# Patient Record
Sex: FEMALE | Race: WHITE | NOT HISPANIC OR LATINO
[De-identification: names, ages, dates, MRNs, and addresses within clinical notes are randomized per-mention and may not be internally consistent; named-entity substitution may affect disease eponyms.]

---

## 2018-07-24 PROBLEM — Z00.00 ENCOUNTER FOR PREVENTIVE HEALTH EXAMINATION: Status: ACTIVE | Noted: 2018-07-24

## 2018-08-23 ENCOUNTER — APPOINTMENT (OUTPATIENT)
Dept: OPHTHALMOLOGY | Facility: CLINIC | Age: 53
End: 2018-08-23

## 2019-03-05 ENCOUNTER — OUTPATIENT (OUTPATIENT)
Dept: OUTPATIENT SERVICES | Facility: HOSPITAL | Age: 54
LOS: 1 days | End: 2019-03-05
Payer: COMMERCIAL

## 2019-03-05 VITALS
HEIGHT: 63 IN | TEMPERATURE: 97 F | OXYGEN SATURATION: 98 % | WEIGHT: 177.91 LBS | HEART RATE: 74 BPM | SYSTOLIC BLOOD PRESSURE: 104 MMHG | DIASTOLIC BLOOD PRESSURE: 62 MMHG | RESPIRATION RATE: 14 BRPM

## 2019-03-05 DIAGNOSIS — M65.30 TRIGGER FINGER, UNSPECIFIED FINGER: ICD-10-CM

## 2019-03-05 DIAGNOSIS — M65.351 TRIGGER FINGER, RIGHT LITTLE FINGER: ICD-10-CM

## 2019-03-05 LAB
HCT VFR BLD CALC: 41.5 % — SIGNIFICANT CHANGE UP (ref 34.5–45)
HGB BLD-MCNC: 13.2 G/DL — SIGNIFICANT CHANGE UP (ref 11.5–15.5)
MCHC RBC-ENTMCNC: 26.9 PG — LOW (ref 27–34)
MCHC RBC-ENTMCNC: 31.8 % — LOW (ref 32–36)
MCV RBC AUTO: 84.5 FL — SIGNIFICANT CHANGE UP (ref 80–100)
NRBC # FLD: 0 K/UL — LOW (ref 25–125)
PLATELET # BLD AUTO: 357 K/UL — SIGNIFICANT CHANGE UP (ref 150–400)
PMV BLD: 10.6 FL — SIGNIFICANT CHANGE UP (ref 7–13)
RBC # BLD: 4.91 M/UL — SIGNIFICANT CHANGE UP (ref 3.8–5.2)
RBC # FLD: 12.9 % — SIGNIFICANT CHANGE UP (ref 10.3–14.5)
WBC # BLD: 9.97 K/UL — SIGNIFICANT CHANGE UP (ref 3.8–10.5)
WBC # FLD AUTO: 9.97 K/UL — SIGNIFICANT CHANGE UP (ref 3.8–10.5)

## 2019-03-05 PROCEDURE — 93010 ELECTROCARDIOGRAM REPORT: CPT

## 2019-03-05 RX ORDER — ALBUTEROL 90 UG/1
2 AEROSOL, METERED ORAL
Qty: 0 | Refills: 0 | COMMUNITY

## 2019-03-05 NOTE — H&P PST ADULT - ASSESSMENT
52 y/o female with hx of right 5th finger stiffness, pain, and locking of joint x 1 year, worsening symptoms.  Pt reports she had steroid injections x2 with only temporary improvement.  Now scheduled for Release Right little finger trigger 3/18/19.

## 2019-03-05 NOTE — H&P PST ADULT - PROBLEM SELECTOR PLAN 1
Release Right little finger trigger 3/18/19.     CBC EKG    pre-op instructions and antibacterial soap given and explained

## 2019-03-05 NOTE — H&P PST ADULT - NSANTHOSAYNRD_GEN_A_CORE
No. KEENAN screening performed.  STOP BANG Legend: 0-2 = LOW Risk; 3-4 = INTERMEDIATE Risk; 5-8 = HIGH Risk

## 2019-03-05 NOTE — H&P PST ADULT - MUSCULOSKELETAL COMMENTS
hx of right 5th finger stiffness, pain, and locking of joint x 1 year, worsening symptoms.  Pt reports she had steroid injections x2 with only temporary improvement.  Now scheduled for Release Right little finger trigger 3/18/19. Hx of right 5th finger stiffness, pain, and locking of joint x 1 year, worsening symptoms.  Pt reports she had steroid injections x2 with only temporary improvement.  Now scheduled for Release Right little finger trigger 3/18/19.

## 2019-03-05 NOTE — H&P PST ADULT - PMH
Anxiety  and depression  Asthma    Eating disorder  Binge eating, pt reports she takes Vivance for  binge eating, denies purging  Hyperlipidemia    Obesity

## 2019-03-05 NOTE — H&P PST ADULT - HISTORY OF PRESENT ILLNESS
54 y/o female with hx of right 5th finger stiffness, pain, and locking of joint x 1 year, worsening symptoms.  Pt reports she had steroid injections x2 with only temporary improvement.  Now scheduled for Release Right little finger trigger 3/18/19.

## 2019-03-05 NOTE — H&P PST ADULT - PSYCHIATRIC COMMENTS
under control with medication anxiety/ depression under control with medication per pt.  Pt reports she takes Vivace for binge eating.  Denies purging

## 2019-03-17 ENCOUNTER — TRANSCRIPTION ENCOUNTER (OUTPATIENT)
Age: 54
End: 2019-03-17

## 2019-03-18 ENCOUNTER — OUTPATIENT (OUTPATIENT)
Dept: OUTPATIENT SERVICES | Facility: HOSPITAL | Age: 54
LOS: 1 days | Discharge: HOME | End: 2019-03-18

## 2019-03-18 VITALS
SYSTOLIC BLOOD PRESSURE: 111 MMHG | WEIGHT: 177.91 LBS | HEIGHT: 63 IN | HEART RATE: 74 BPM | DIASTOLIC BLOOD PRESSURE: 72 MMHG | OXYGEN SATURATION: 98 % | RESPIRATION RATE: 14 BRPM | TEMPERATURE: 99 F

## 2019-03-18 VITALS
SYSTOLIC BLOOD PRESSURE: 101 MMHG | OXYGEN SATURATION: 98 % | RESPIRATION RATE: 19 BRPM | DIASTOLIC BLOOD PRESSURE: 78 MMHG | HEART RATE: 66 BPM | TEMPERATURE: 98 F

## 2019-03-18 DIAGNOSIS — M65.351 TRIGGER FINGER, RIGHT LITTLE FINGER: ICD-10-CM

## 2019-03-18 RX ORDER — PREGABALIN 225 MG/1
1 CAPSULE ORAL
Qty: 0 | Refills: 0 | COMMUNITY

## 2019-03-18 RX ORDER — BUDESONIDE, MICRONIZED 100 %
1 POWDER (GRAM) MISCELLANEOUS
Qty: 0 | Refills: 0 | COMMUNITY

## 2019-03-18 RX ORDER — ONDANSETRON 8 MG/1
1 TABLET, FILM COATED ORAL
Qty: 8 | Refills: 0 | OUTPATIENT
Start: 2019-03-18 | End: 2019-03-19

## 2019-03-18 RX ORDER — LISDEXAMFETAMINE DIMESYLATE 70 MG/1
1 CAPSULE ORAL
Qty: 0 | Refills: 0 | COMMUNITY

## 2019-03-18 RX ORDER — ATORVASTATIN CALCIUM 80 MG/1
1 TABLET, FILM COATED ORAL
Qty: 0 | Refills: 0 | COMMUNITY

## 2019-03-18 RX ORDER — CHOLECALCIFEROL (VITAMIN D3) 125 MCG
1 CAPSULE ORAL
Qty: 0 | Refills: 0 | COMMUNITY

## 2019-03-18 RX ORDER — ALBUTEROL 90 UG/1
2 AEROSOL, METERED ORAL
Qty: 0 | Refills: 0 | COMMUNITY

## 2019-03-18 RX ORDER — FLUOXETINE HCL 10 MG
1 CAPSULE ORAL
Qty: 0 | Refills: 0 | COMMUNITY

## 2019-03-18 NOTE — ASU PREOPERATIVE ASSESSMENT, ADULT ([facility] ONLY) - TEACHING/LEARNING LEARNING PREFERENCES
verbal instruction/skill demonstration/written material/group instruction/individual instruction/pictorial

## 2019-03-18 NOTE — ASU DISCHARGE PLAN (ADULT/PEDIATRIC) - CALL YOUR DOCTOR IF YOU HAVE ANY OF THE FOLLOWING:
Wound/Surgical Site with redness, or foul smelling discharge or pus/Bleeding that does not stop/Fever greater than (need to indicate Fahrenheit or Celsius)/Swelling that gets worse/Pain not relieved by Medications/Numbness, tingling, color or temperature change to extremity Unable to urinate/Inability to tolerate liquids or foods/Wound/Surgical Site with redness, or foul smelling discharge or pus/Numbness, tingling, color or temperature change to extremity/Fever greater than (need to indicate Fahrenheit or Celsius)/Pain not relieved by Medications/Nausea and vomiting that does not stop/Bleeding that does not stop/Swelling that gets worse

## 2019-03-18 NOTE — ASU DISCHARGE PLAN (ADULT/PEDIATRIC) - ASU DC SPECIAL INSTRUCTIONSFT
see instruction sheet in chart see instruction sheet in chart  Ice 20 minutes on/20 minutes off   Do hand exercises as seen on MD instruction sheet   Don't touch site, don't apply lotions or ointments, pat dry after shower

## 2019-06-24 NOTE — ASU PREOP CHECKLIST - SURGICAL CONSENT
[facility] - New Patient    Referring Provider: Subhash Palmer    CHIEF COMPLAINT: Elevated liver enzymes, fatty liver    HPI: This is a 39 y.o. White female referred for evaluation of elevated liver enzymes and fatty liver.    She reports fatty liver was initially diagnosed on imaging in 2015 (CT abd pelvis 7/14/15). BMI noted to be 39 in 2017.     Initially followed by Dr. Robles in bariatric medicine and was able to lose ~50 lb.   She underwent laparoscopic Adilson-en-Y gastric bypass (11/8/18) and has lost an additional 50 lb. Body mass index is 25.34 kg/m². No liver biopsy done at this time.    Labs:  -Transaminases started increasing in Jan 2019, ALT>AST. ALT up to 111 last month.   -ALP normal  -Synthetic liver function normal  -Platelets normal    No recent abdominal imaging.  Had EGD 8/15/18 w/ normal esophagus.    No new medications or med changes.  No drug use. Rare alcohol use.   No family history of liver disease.    Reports from Feb-May is when she had the most significant weight loss.   Has had to makes significant dietary changes s/p gastric surgery.  No concerning herbal supplements.    Feels well overall. Denies symptoms of hepatic decompensation including jaundice, ascites, cognitive problems to suggest hepatic encephalopathy, or GI bleeding.     Other noted history:  IBS          Review of patient's allergies indicates:  No Known Allergies     Current Outpatient Medications on File Prior to Visit   Medication Sig Dispense Refill    ALPRAZolam (XANAX) 0.5 MG tablet Take 1 tablet (0.5 mg total) by mouth 2 (two) times daily as needed for anxiety. 60 tablet 5    amitriptyline (ELAVIL) 75 MG tablet Take 1 tablet (75 mg total) by mouth every evening. 90 tablet 1    b complex vitamins capsule Take 1 capsule by mouth once daily.      BIOTIN ORAL Take 200 mg by mouth once daily.       buPROPion (WELLBUTRIN XL) 300 MG 24 hr tablet Take 1 tablet (300 mg total) by mouth once daily. 90  tablet 1    CALCIUM CITRATE ORAL Take 1 tablet by mouth 3 (three) times daily.      dicyclomine (BENTYL) 20 mg tablet TAKE 1 TABLET FOUR TIMES A DAY BEFORE MEALS AND NIGHTLY 360 tablet 0    eszopiclone (LUNESTA) 3 mg Tab Take 1 tablet (3 mg total) by mouth nightly as needed. 90 tablet 1    fluconazole (DIFLUCAN) 200 MG Tab Take one tablet (200 mg) as needed for vulvar irritation.May take one tablet every 3 days as needed.Do not exceed 3 tablets consecutively. 30 tablet 0    lubiprostone (AMITIZA) 8 MCG Cap Take 1 capsule (8 mcg total) by mouth 2 (two) times daily with meals. 180 capsule 1    MAGNESIUM ORAL Take 400 mg by mouth 2 (two) times daily.      omeprazole (PRILOSEC) 40 MG capsule Take 1 capsule (40 mg total) by mouth every morning. Open capsule and take with apple sauce (Patient taking differently: Take 40 mg by mouth 2 (two) times daily before meals. Open capsule and take with apple sauce) 90 capsule 1    pediatric multivit-iron-min (FLINTSTONES COMPLETE, IRON,) Chew Take 1 tablet by mouth 2 (two) times daily.       No current facility-administered medications on file prior to visit.          PMHX:  has a past medical history of Abdominal pain, other specified site, Acid reflux, Anxiety, Bilateral ovarian cysts, Bulging lumbar disc, DDD (degenerative disc disease), lumbar, Degenerative disc disease (2017), Depression, Fatigue, Fatty liver, Fibroid tumor, Heartburn, IBS (irritable bowel syndrome), Menorrhagia, and Ulcer ().    PSHX:  has a past surgical history that includes Tubal ligation (2000);  section (3/1997; 2000); gall bladder (2011); Colonoscopy (); Esophagogastroduodenoscopy (); Esophagogastroduodenoscopy (); Hysterectomy (2016); Cholecystectomy (2009); Esophagogastroduodenoscopy (N/A, 8/15/2018); Laparoscopic gastroenterostomy (N/A, 2018); and Upper gastrointestinal endoscopy.    FAMILY HISTORY:   Family History   Problem Relation Age of  Onset    Cancer Mother         cervical CA    Bipolar disorder Mother     Drug abuse Mother     Ovarian cancer Mother     Kidney disease Father     Alcohol abuse Father     Drug abuse Brother     Colon cancer Maternal Grandmother     Cancer Maternal Grandmother     Obesity Maternal Grandmother     Stroke Maternal Grandmother     Cancer Maternal Aunt         uterine CA    Breast cancer Paternal Aunt     Cancer Paternal Aunt         breast CA    Cirrhosis Neg Hx        SOCIAL HISTORY:   Social History     Tobacco Use   Smoking Status Former Smoker    Packs/day: 0.50    Years: 6.00    Pack years: 3.00    Types: Cigarettes    Start date: 11/10/1996    Last attempt to quit: 2012    Years since quittin.0   Smokeless Tobacco Never Used   Tobacco Comment    quit 6 years ago       Social History     Substance and Sexual Activity   Alcohol Use Yes    Frequency: 2-4 times a month       Social History     Substance and Sexual Activity   Drug Use No         Review of Systems   Constitutional: Negative for appetite change, chills, fatigue, fever. (+) weight loss  Eyes: Negative for visual disturbance.   Respiratory: Negative for cough and shortness of breath.    Cardiovascular: Negative for chest pain, palpitations and leg swelling.   Gastrointestinal: Negative for abdominal distention, abdominal pain, blood in stool, constipation, diarrhea, nausea and vomiting. No change in stool color.  Genitourinary: Negative for dysuria and hematuria. Denies dark urine.   Musculoskeletal: Negative for arthralgias, gait problem, joint swelling and myalgias.   Skin: Negative for color change, rash and wound. Denies itching.   Neurological: Negative for dizziness, tremors, speech difficulty, and headaches.   Hematological: Does not bruise/bleed easily.   Psychiatric/Behavioral: Negative for confusion, decreased concentration and sleep disturbance. Denies memory loss. Denies depression. Reports she is nervous  "today.      Physical Exam   Constitutional: Well-nourished. No distress. Alert and oriented to person, place, and time.  Eyes: No scleral icterus.   Cardiovascular: Normal rate, regular rhythm and normal heart sounds. No murmur heard.  Pulmonary/Chest: Respiratory effort and breath sounds normal. No respiratory distress.   Abdominal: Soft, non-tender. Bowel sounds are normal. No distension; no ascites appreciated. There is no palpable hepatosplenomegaly. No hernia or mass.   Musculoskeletal: No edema.   Neurological: No tremor. Coordination and gait normal. No asterixis.    Skin: Skin is warm and dry. No rash or erythema. No jaundice. No telangiectasias or palmar erythema noted.  Psychiatric: Normal mood and affect. Speech, behavior, and thought content normal. No depression or anxiety noted.       /73 (BP Location: Right arm, Patient Position: Sitting, BP Method: Medium (Automatic))   Pulse 90   Ht 5' 8" (1.727 m)   Wt 75.6 kg (166 lb 10.7 oz)   LMP 11/16/2016 (Exact Date)   SpO2 100%   BMI 25.34 kg/m²           LABS:  Lab Results   Component Value Date    WBC 5.76 05/24/2019    HGB 13.1 05/24/2019    HCT 40.7 05/24/2019     05/24/2019     05/24/2019    K 4.1 05/24/2019    CREATININE 0.9 05/24/2019    ALT 85 (H) 06/24/2019    AST 41 (H) 06/24/2019    ALKPHOS 40 (L) 06/24/2019    BILITOT 0.3 06/24/2019    BILIDIR 0.2 06/24/2019    ALBUMIN 4.0 06/24/2019    IGGSERUM 1002 06/24/2019    FERRITIN 173 06/24/2019    FESATURATED 23 06/24/2019    CERULOPLSM 28.0 06/24/2019    CHOL 139 05/24/2019    TRIG 61 05/24/2019    LDLCALC 63.8 05/24/2019    HGBA1C 5.2 10/24/2018       No results found for: HEPAIGG    No results found for: HEPBSAG  No results found for: HEPBCAB  No results found for: HEPBSAB  No results found for: HEPCAB  Immunization History   Administered Date(s) Administered    Influenza - Quadrivalent 10/19/2016, 11/09/2017    Influenza - Quadrivalent - PF 09/30/2014    Tdap 10/19/2016 "          DIAGNOSTIC STUDIES:  EGD - 8/15/18  Findings:       The examined esophagus was normal.       Esophagogastric landmarks were identified: the Z-line was found at        39 cm and the gastroesophageal junction was found at 39 cm from the        incisors. No hiatal hernia appreciated during this exam.       The cardia and gastric fundus were normal on retroflexion.       Patchy mildly erythematous mucosa was found in the gastric body and        in the gastric antrum.       Patchy moderate inflammation characterized by adherent blood,        erosions and erythema was found in the gastric antrum. Biopsies were        obtained in the gastric body and in the gastric antrum with cold        forceps for histology.       Patchy mild inflammation characterized by erosions and erythema was        found in the duodenal bulb. Biopsies were taken with a cold forceps        for histology.       The second portion of the duodenum was normal. Biopsies were taken        with a cold forceps for histology.  Impression:   - Normal esophagus.                        - Esophagogastric landmarks identified.                        - Erythematous mucosa in the gastric body and                         antrum.                        - Gastritis.                        - Duodenitis. Biopsied.                        - Normal second portion of the duodenum. Biopsied.                        - Biopsies were obtained in the gastric body and in                         the gastric antrum.        ASSESSMENT / PLAN:  39 y.o. White female with:      1. Elevated liver enzymes - Etiology unclear at this time. May be due to rapid weight loss. NAFLD risk factors are well controlled at this time.   -- Labs today: serologic workup to r/o other disorders that could be causing elevated liver enzymes  -- Abdominal US     2. NAFLD  -- s/p bariatric surgery with significant weight loss  -- Continue diet/exericse and maintaining good control of blood pressure,  cholesterol, and blood sugar  -- Fibroscan for fibrosis and steatosis staging    3. S/p Adilson-en-Y       Orders Placed This Encounter   Procedures    US Abdomen Complete    US Elastography Liver    Hepatic function panel    Alpha 1 Antitrypsin Phenotype    ROSE Screen w/Reflex    Antimitochondrial antibody    Anti-smooth muscle antibody    IgG    Ceruloplasmin    Ferritin    Iron and TIBC    Hepatitis C antibody    Hepatitis B surface antigen           Return to clinic pending above results.         Thank you for allowing me to participate in the care of Anat MccallumLINDA Donnelly-C  Hepatology and Liver Transplant     Patient was seen in clinic with Dr. Michaels; Case discussed, plan reviewed.       Addendum: Fibroscan kPa = 5.6, F0-F1, no-mild fibrosis. CAP = 206, less than S1, <11% steatosis. Will notify patient of results via [facility].     done

## 2020-07-06 PROBLEM — E78.5 HYPERLIPIDEMIA, UNSPECIFIED: Chronic | Status: ACTIVE | Noted: 2019-03-05

## 2020-07-06 PROBLEM — E66.9 OBESITY, UNSPECIFIED: Chronic | Status: ACTIVE | Noted: 2019-03-05

## 2020-07-06 PROBLEM — F50.9 EATING DISORDER, UNSPECIFIED: Chronic | Status: ACTIVE | Noted: 2019-03-05

## 2020-07-06 PROBLEM — F41.9 ANXIETY DISORDER, UNSPECIFIED: Chronic | Status: ACTIVE | Noted: 2019-03-05

## 2020-07-06 PROBLEM — J45.909 UNSPECIFIED ASTHMA, UNCOMPLICATED: Chronic | Status: ACTIVE | Noted: 2019-03-05

## 2020-07-09 ENCOUNTER — APPOINTMENT (OUTPATIENT)
Dept: ORTHOPEDIC SURGERY | Facility: CLINIC | Age: 55
End: 2020-07-09
Payer: COMMERCIAL

## 2020-07-09 DIAGNOSIS — M65.341 TRIGGER FINGER, RIGHT RING FINGER: ICD-10-CM

## 2020-07-09 DIAGNOSIS — Z87.09 PERSONAL HISTORY OF OTHER DISEASES OF THE RESPIRATORY SYSTEM: ICD-10-CM

## 2020-07-09 DIAGNOSIS — Z86.39 PERSONAL HISTORY OF OTHER ENDOCRINE, NUTRITIONAL AND METABOLIC DISEASE: ICD-10-CM

## 2020-07-09 DIAGNOSIS — Z80.9 FAMILY HISTORY OF MALIGNANT NEOPLASM, UNSPECIFIED: ICD-10-CM

## 2020-07-09 DIAGNOSIS — Z87.891 PERSONAL HISTORY OF NICOTINE DEPENDENCE: ICD-10-CM

## 2020-07-09 PROCEDURE — 99203 OFFICE O/P NEW LOW 30 MIN: CPT | Mod: 25

## 2020-07-09 PROCEDURE — 20550 NJX 1 TENDON SHEATH/LIGAMENT: CPT | Mod: F8

## 2020-07-09 RX ORDER — MOMETASONE FUROATE 100 UG/1
100 AEROSOL RESPIRATORY (INHALATION)
Refills: 0 | Status: ACTIVE | COMMUNITY

## 2020-07-09 RX ORDER — ATORVASTATIN CALCIUM 80 MG/1
TABLET, FILM COATED ORAL
Refills: 0 | Status: ACTIVE | COMMUNITY

## 2020-07-09 RX ORDER — FLUOXETINE HCL 10 MG
TABLET ORAL
Refills: 0 | Status: ACTIVE | COMMUNITY

## 2020-07-09 RX ORDER — LISDEXAMFETAMINE DIMESYLATE 10 MG/1
CAPSULE ORAL
Refills: 0 | Status: ACTIVE | COMMUNITY

## 2020-07-09 RX ORDER — BIOTIN 10 MG
TABLET ORAL
Refills: 0 | Status: ACTIVE | COMMUNITY

## 2020-07-09 NOTE — DISCUSSION/SUMMARY
[FreeTextEntry1] : The underlying pathophysiology was reviewed with the patient. Treatment options were discussed including; observation, NSAIDS, analgesics, injection and surgical intervention. \par \par The patient wishes to proceed with a cortisone injection at this time. The skin was prepped with alcohol and sprayed with Ethyl Chloride. An injection of 0.5 cc 1% Lidocaine without epinephrine, 0.25 cc Kenalog 40mg, and 0.25 cc Dexamethasone was administered into the flexor tendon sheath of the right ring finger (1/3). The patient tolerated the procedure well. Apply ice. \par \par Patient was advised to splint the right ring finger to sleep avoid recurrence of triggering while she is asleep. She should also attempt to stretch the finger against a solid surface to avoid further scarring of the tendon. \par The patient was advised to soak the hand in warm water and Epsom salt.\par NSAIDs, as tolerated, were advised for pain and symptom management. \par Follow up as needed.

## 2020-07-09 NOTE — ADDENDUM
[FreeTextEntry1] : I, Grace Cervantes wrote this note acting as a scribe for Dr. Stepan Zurita on Jul 09, 2020.

## 2020-07-09 NOTE — PHYSICAL EXAM
[de-identified] : No imaging done today.  [de-identified] : Patient is WDWN, alert, and in no acute distress. Breathing is unlabored. She is grossly oriented to person, place, and time. \par \par Right hand: The ring finger is a flexed position at the PIPJ. There is A1 pulley tenderness in the finger. Localized swelling over the palm. Fully healed vertical incision along the little finger flexor tendon. Full arc of motion in the fingers, and all intrinsic and extrinsic hand muscles 5/5. No joint instability on provocative testing, sensation is intact to light touch, and no skin lesions or discoloration.

## 2020-07-09 NOTE — END OF VISIT
[FreeTextEntry3] : I, Stepan Zurita MD, ordering physician, have read and attest that all the information, medical decision making and discharge instructions within are true and accurate.

## 2020-07-09 NOTE — HISTORY OF PRESENT ILLNESS
[Right] : right hand dominant [FreeTextEntry1] : Patient is a RHD 53 y/o female who presents with right ring finger trigger x 2-3 weeks. She denies injury or any antecedent event which may have contributed to this developing. She has pain along the A1 pulley tendon. It is swollen and sensitive to the touch. The finger has remained locked in a flexed position and she must actively return it to neutral with the opposing hand.  The pain is severe.  The pain radiates into the long finger as well.  She is s/p right little finger trigger release by Dr. Lea. The surgery was performed on 03/18/2019.

## 2020-07-20 NOTE — ASU PREOPERATIVE ASSESSMENT, ADULT ([facility] ONLY) - RESPIRATORY RATE (BREATHS/MIN)
Surgical Pathology Report



Patient Name:  JENI TURNER

Accession #:  [id number]

Med. Rec. #:  [id number]                                                        

   /Age/Gender:  1967 (Age: 53) / F

Account:  [id number]                                                          

             Location: ASU-ENDOSCOPY

Taken:  2020

Received:  2020

Reported:  2020

Physicians:  Peng Ambrosio M.D.

  



Specimen(s) Received

A: DUODENUM 

B: STOMACH 





Clinical History

Colon screening, abdominal pain

Postoperative diagnosis: Gastritis, hemorrhoids







Final Diagnosis

A. DUODENUM, BIOPSY:

DUODENAL MUCOSA WITHOUT SIGNIFICANT PATHOLOGIC FINDINGS.



B. STOMACH, BIOPSY:

GASTRIC OXYNTIC MUCOSA WITH MILD CHRONIC GASTRITIS.

IMMUNOHISTOCHEMICAL STAIN FOR H. PYLORI IS NEGATIVE.



Positive and negative controls (internal if applicable) show appropriate

results.







***Electronically Signed***

Meryl Lozoya M.D.





Gross Description

A.  Received in formalin, labeled "biopsy duodenum" are 3 tan, irregular

portions of soft tissue ranging from 0.2-0.3 cm. in greatest dimension. The

specimens are submitted in toto in one cassette.



B.  Received in formalin, labeled "biopsy stomach" is a tan, irregular portion

of soft tissue measuring 0.8 cm. in greatest dimension. The specimen is

submitted in toto in one cassette.





saudi
14

## 2021-01-17 ENCOUNTER — FORM ENCOUNTER (OUTPATIENT)
Age: 56
End: 2021-01-17

## 2021-01-18 ENCOUNTER — TRANSCRIPTION ENCOUNTER (OUTPATIENT)
Age: 56
End: 2021-01-18

## 2021-01-19 ENCOUNTER — TRANSCRIPTION ENCOUNTER (OUTPATIENT)
Age: 56
End: 2021-01-19

## 2021-01-21 ENCOUNTER — OUTPATIENT (OUTPATIENT)
Dept: INPATIENT UNIT | Facility: HOSPITAL | Age: 56
LOS: 1 days | End: 2021-01-21
Payer: COMMERCIAL

## 2021-01-21 ENCOUNTER — APPOINTMENT (OUTPATIENT)
Dept: DISASTER EMERGENCY | Facility: HOSPITAL | Age: 56
End: 2021-01-21

## 2021-01-21 VITALS
RESPIRATION RATE: 18 BRPM | WEIGHT: 180.78 LBS | HEIGHT: 63 IN | TEMPERATURE: 98 F | DIASTOLIC BLOOD PRESSURE: 78 MMHG | HEART RATE: 82 BPM | OXYGEN SATURATION: 96 % | SYSTOLIC BLOOD PRESSURE: 122 MMHG

## 2021-01-21 VITALS
OXYGEN SATURATION: 98 % | TEMPERATURE: 98 F | SYSTOLIC BLOOD PRESSURE: 120 MMHG | DIASTOLIC BLOOD PRESSURE: 76 MMHG | RESPIRATION RATE: 18 BRPM | HEART RATE: 76 BPM

## 2021-01-21 DIAGNOSIS — U07.1 COVID-19: ICD-10-CM

## 2021-01-21 PROCEDURE — M0239: CPT

## 2021-01-21 RX ORDER — BAMLANIVIMAB 35 MG/ML
700 INJECTION, SOLUTION INTRAVENOUS ONCE
Refills: 0 | Status: COMPLETED | OUTPATIENT
Start: 2021-01-21 | End: 2021-01-21

## 2021-01-21 RX ADMIN — BAMLANIVIMAB 270 MILLIGRAM(S): 35 INJECTION, SOLUTION INTRAVENOUS at 09:01

## 2021-01-21 NOTE — CHART NOTE - NSCHARTNOTEFT_GEN_A_CORE
CC: Monoclonal Antibody Infusion/COVID 19 Positive  55yFemale with PMHx HLD, Asthma and symptoms fever, cough, GI symptoms    exam/findings:  T(C): 36.9 (01-21-21 @ 09:18), Max: 36.9 (01-21-21 @ 08:45)  HR: 73 (01-21-21 @ 09:18) (73 - 82)  BP: 109/54 (01-21-21 @ 09:18) (109/54 - 122/78)  RR: 18 (01-21-21 @ 09:18) (18 - 18)  SpO2: 96% (01-21-21 @ 09:18) (96% - 96%)      PE:   Appearance: NAD	  HEENT:   Normal oral mucosa,   Lymphatic: No lymphadenopathy  Cardiovascular: Normal S1 S2, No JVD, No murmurs, No edema  Respiratory: Lungs clear to auscultation	  Gastrointestinal:  Soft, Non-tender, + BS	  Skin: warm and dry  Neurologic: Non-focal  Extremities: Normal range of motion, no calf tenderness or edema    ASSESSMENT:  Pt is a 54y/o female  Covid 19 Positive,   referred by Dr. Sims who presents to infusion center for Monoclonal antibody infusion (bamlanivimab)  symptoms/ Criteria: Cough, fever, Malaise, GI symptoms, Fatigue.  Risk Profile includes: HLD, Asthma.    PLAN:  - infusion procedure explained to patient   -Consent for monoclonal antibody infusion obtained   - Risk & benefits discussed/all questions answered  -infuse  [id number]  IV over one hour   -observe patient for one hour post infusion        I have reviewed the  bamlamnivimabEmergency Use Authorization (EAU) and I have provided the patient or patient's caregiver with the following information:  1. FDA has authorized emergency use of bamlamnivimab, which is not FDA-approved biologic product.  2. The patient or patient's caregiver has the option to accept or refuse administration of bamlamnivimab  3. The significant known and benefits are unknown.  4. Information on available alternative treatments and risks and benefits of those alternatives.    Discharge:  Patient tolerated infusion well denies complaints of chest pain/SOB/dizziness/ palps  Vital signs --- for discharge home ---  D/C instructions given/ fact sheet included.  Patient to follow-up with PCP as needed. CC: Monoclonal Antibody Infusion/COVID 19 Positive  55yFemale with PMHx HLD, Asthma and symptoms fever, cough, GI symptoms    exam/findings:  T(C): 36.9 (01-21-21 @ 09:18), Max: 36.9 (01-21-21 @ 08:45)  HR: 73 (01-21-21 @ 09:18) (73 - 82)  BP: 109/54 (01-21-21 @ 09:18) (109/54 - 122/78)  RR: 18 (01-21-21 @ 09:18) (18 - 18)  SpO2: 96% (01-21-21 @ 09:18) (96% - 96%)      PE:   Appearance: NAD	  HEENT:   Normal oral mucosa,   Lymphatic: No lymphadenopathy  Cardiovascular: Normal S1 S2, No JVD, No murmurs, No edema  Respiratory: Lungs clear to auscultation	  Gastrointestinal:  Soft, Non-tender, + BS	  Skin: warm and dry  Neurologic: Non-focal  Extremities: Normal range of motion, no calf tenderness or edema    ASSESSMENT:  Pt is a 54y/o female  Covid 19 Positive,   referred by Dr. Sims who presents to infusion center for Monoclonal antibody infusion (bamlanivimab)  symptoms/ Criteria: Cough, fever, Malaise, GI symptoms, Fatigue.  Risk Profile includes: HLD, Asthma.    PLAN:  - infusion procedure explained to patient   -Consent for monoclonal antibody infusion obtained   - Risk & benefits discussed/all questions answered  -infuse  [id number]  IV over one hour   -observe patient for one hour post infusion        I have reviewed the  bamlamnivimabEmergency Use Authorization (EAU) and I have provided the patient or patient's caregiver with the following information:  1. FDA has authorized emergency use of bamlamnivimab, which is not FDA-approved biologic product.  2. The patient or patient's caregiver has the option to accept or refuse administration of bamlamnivimab  3. The significant known and benefits are unknown.  4. Information on available alternative treatments and risks and benefits of those alternatives.    Discharge:  Patient tolerated infusion well denies complaints of chest pain/SOB/dizziness/ palps  Vital signs Vital Signs Last 24 Hrs  T(C): 36.8 (21 Jan 2021 10:15), Max: 36.9 (21 Jan 2021 08:45)  T(F): 98.3 (21 Jan 2021 10:15), Max: 98.5 (21 Jan 2021 08:45)  HR: 67 (21 Jan 2021 10:15) (67 - 82)  BP: 127/76 (21 Jan 2021 10:15) (109/54 - 127/76)  BP(mean): --  RR: 18 (21 Jan 2021 10:15) (18 - 18)  SpO2: 98% (21 Jan 2021 10:15) (96% - 98%) for discharge home -at 11:04  D/C instructions given/ fact sheet included.  Patient to follow-up with PCP as needed.

## 2021-03-12 ENCOUNTER — EMERGENCY (EMERGENCY)
Facility: HOSPITAL | Age: 56
LOS: 1 days | Discharge: HOME | End: 2021-03-12
Attending: STUDENT IN AN ORGANIZED HEALTH CARE EDUCATION/TRAINING PROGRAM | Admitting: STUDENT IN AN ORGANIZED HEALTH CARE EDUCATION/TRAINING PROGRAM
Payer: COMMERCIAL

## 2021-03-12 VITALS
OXYGEN SATURATION: 100 % | HEIGHT: 63 IN | HEART RATE: 83 BPM | RESPIRATION RATE: 16 BRPM | SYSTOLIC BLOOD PRESSURE: 149 MMHG | DIASTOLIC BLOOD PRESSURE: 99 MMHG | TEMPERATURE: 98 F

## 2021-03-12 PROCEDURE — 99283 EMERGENCY DEPT VISIT LOW MDM: CPT | Mod: 25

## 2021-03-12 PROCEDURE — 12011 RPR F/E/E/N/L/M 2.5 CM/<: CPT

## 2021-03-12 RX ORDER — IBUPROFEN 200 MG
400 TABLET ORAL ONCE
Refills: 0 | Status: COMPLETED | OUTPATIENT
Start: 2021-03-12 | End: 2021-03-12

## 2021-03-12 RX ADMIN — Medication 400 MILLIGRAM(S): at 19:46

## 2021-03-12 RX ADMIN — Medication 300 MILLIGRAM(S): at 20:03

## 2021-03-12 NOTE — ED PROVIDER NOTE - OBJECTIVE STATEMENT
54 yo F with PMH of asthma, anxiety, hld, sent from urgent care to ER c/o fall with nasal fracture w/ laceration today. Pt states she trip and fell forward, hitting nose area, no LOC. Reports she was able to get up and ambulate, went to urgent care for evaluation. Admits she has xray and told to go to the ER for evaluation. Admits pressure pain to nose area w/ bleeding. Denies any fever, chills, headache, dizziness, visual disturbances, neck pain, chest pain, sob, back pain, n/v/d, abdominal pain, pelvic pain, hip pain, leg pain, weakness, numbness or any other complaints.

## 2021-03-12 NOTE — ED PROVIDER NOTE - ATTENDING CONTRIBUTION TO CARE
55F with pmh asthma, anxiety, hld presenting s/p trip and fall with facial trauma. States was walking and tripped over an uneven sidewalk. Denies any prodromal symptoms or LOC. No AC. States went to urgent care with XR showing nondisplaced nasal bone fracture and directed to ED for further evaluation after receiving Tdap update.     GEN: NAD, awake, well appearing  HEENT: diffuse swelling to nasal bridge, no nasal septal hematoma, some dry blood to left nare with no active bleeding, no gross deformity or misalignment of nose, no crepitus, 1cm laceration to superior portion of bridge of nose. No tenderness to other facial bones. EOM intact. 1cm superficial T shaped laceration to upper lip,  CHEST/LUNGS: Non-tachypneic, CTAB, bilateral breath sounds  CARDIAC: Non-tachycardic, s1s2, normal perfusion, no peripheral edema  ABDOMEN: Soft, NTND, No rebound/guarding  MSK: No joint tenderness, no gross deformity of extremities  SKIN: 1cm laceration to nose bridge   NEURO: CN grossly intact, normal coordination, no focal motor or sensory deficits  PSYCH: Alert, appropriate, cooperative     Patient presenting with laceration and nasal fracture s/p mechanical fall. Exam not significant for any suspicion for other fractures. Will repair laceration to bridge of nose. Lip laceration superficial with no indication for sutures. Will reassess after cleaning. Prophylactic clindamycin given penicillin allergy for lip laceration.

## 2021-03-12 NOTE — ED PROVIDER NOTE - PATIENT PORTAL LINK FT
You can access the FollowMyHealth Patient Portal offered by [facility] by registering at the following website: [email or web address]. By joining [employer]’s FollowMyHealth portal, you will also be able to view your health information using other applications (apps) compatible with our system.

## 2021-03-12 NOTE — ED PROVIDER NOTE - NSFOLLOWUPINSTRUCTIONS_ED_ALL_ED_FT
Follow up with your PMD & ENT. Keep sutures covered and dry for 24 hours then clean with soap and water daily. Apply bacitracin and cover.  Sutures of absorbable, recommend removing after 3-5 days.   Take Clindamycin 300mg, 3 times a day for 7 days. Take Tylenol 650mg (Two 325 mg pills) every 4-6 hours as needed for pain. Apply cool compresses for 15 minutes to affected area, 3-4 times per day. Any increased pain, redness, streaking (red lines), swelling, fever, chills return to ER.

## 2021-03-12 NOTE — ED PROVIDER NOTE - CLINICAL SUMMARY MEDICAL DECISION MAKING FREE TEXT BOX
54 yo F with PMH of asthma, anxiety, hld, sent from urgent care to ER c/o fall with nasal fracture w/ laceration today. Pt had facial Xray at urgent care, given tetanus shot. Pt is AAOx3, no focal neuro deficits, no LOC, not on anticoagulation. Plan: wound care, laceration repair, pain control, antibiotic for prophylaxis.

## 2021-03-12 NOTE — ED PROVIDER NOTE - PROGRESS NOTE DETAILS
PA TRAMAINE: lac repaired. Discussed with attending, patient can be discharged home to f/u with PCP & ENT. The patient was given verbal and written discharge instructions. Specifically, instructions when to return to the ED and when to seek follow-up from their pcp was discussed. Any specialty follow-up was discussed, including how to make an appointment.  Instructions were discussed in simple, plain language and was understood by the patient. The patient understands that should their symptoms worsen or any new symptoms arise, they should return to the ED immediately for further evaluation. All pt's questions were answered. Patient verbalizes understanding.

## 2021-03-12 NOTE — ED PROVIDER NOTE - MUSCULOSKELETAL MINIMAL EXAM
terbinafine (LAMISIL) 250 MG    Last Written Prescription Date:  9/16/20  Last Fill Quantity: 30,   # refills: 0  Last Office Visit : 9/16/20  Future Office visit:  12/16/20  RTC    3MOS  Routing refill request to provider for review/approval because:  Drug not on the refill protocol        atraumatic/normal range of motion/motor intact

## 2021-03-12 NOTE — ED PROVIDER NOTE - CARE PLAN
Principal Discharge DX:	Laceration of nose, initial encounter  Secondary Diagnosis:	Fall, initial encounter

## 2021-03-12 NOTE — ED ADULT TRIAGE NOTE - CHIEF COMPLAINT QUOTE
[facility] facial fractures and lacerations to face s/p trip and fall today. bleeding controlled at this time. denies LOC or dizziness. denies anticoagulant use. [facility] facial fracture and lacerations to face s/p trip and fall today. bleeding controlled at this time. denies LOC or dizziness. denies anticoagulant use.

## 2021-03-12 NOTE — ED PROVIDER NOTE - NOSE FINDINGS
dry blood noted in b/l nostril, no active bleeding, no septal hematoma b/l, no obstruction, small laceration to upper bridge of nose, +swelling, nose straight, no deformity noted, no sinus tenderness b/l.

## 2021-03-12 NOTE — ED PROVIDER NOTE - SKIN LOCATION #1
2cm horizontal laceration to upper bridge of nose, deep to subcutaneous tissue, no bone exposure./nose

## 2021-04-06 ENCOUNTER — APPOINTMENT (OUTPATIENT)
Dept: PLASTIC SURGERY | Facility: CLINIC | Age: 56
End: 2021-04-06
Payer: COMMERCIAL

## 2021-04-06 VITALS — HEIGHT: 63 IN | BODY MASS INDEX: 31.89 KG/M2 | WEIGHT: 180 LBS

## 2021-04-06 PROCEDURE — 99203 OFFICE O/P NEW LOW 30 MIN: CPT | Mod: 25

## 2021-04-06 PROCEDURE — 11900 INJECT SKIN LESIONS </W 7: CPT

## 2021-04-06 PROCEDURE — 99072 ADDL SUPL MATRL&STAF TM PHE: CPT

## 2021-04-07 NOTE — HISTORY OF PRESENT ILLNESS
[FreeTextEntry1] : 55 years old Patient presents to the office for a scar revision on her nasal bridge.\par Pt fell and hit her face on ground on [place] 03/12/21, went to [facility] where Xray was done and nasal laceration was repaired. \par C/o redness and discomfort around scar.\par No scar treatment done.\par Here to discuss possible treatment.

## 2021-04-25 NOTE — H&P PST ADULT - PULMONARY EMBOLUS
Applied dressing on left hand per Dr. Loza order. Pt was in pain during procedure. Administered Norco per MAR. Dressing dry and intact. Placed a rolled blanket under arm for support. Ice packs provided.     OT called. Per OT,  adequate supplies for hand splint not available until Monday 4/26.     Pt in room resting. Will continue to monitor.   no

## 2021-04-27 ENCOUNTER — APPOINTMENT (OUTPATIENT)
Dept: PLASTIC SURGERY | Facility: CLINIC | Age: 56
End: 2021-04-27
Payer: COMMERCIAL

## 2021-04-27 PROCEDURE — 99213 OFFICE O/P EST LOW 20 MIN: CPT

## 2021-04-27 PROCEDURE — 99072 ADDL SUPL MATRL&STAF TM PHE: CPT

## 2021-04-27 NOTE — HISTORY OF PRESENT ILLNESS
[FreeTextEntry1] : pt w/ dorsal scar \par s/p kenalog injection\par improving in thickness\par no pain and no complaints\par

## 2021-06-10 ENCOUNTER — APPOINTMENT (OUTPATIENT)
Dept: PLASTIC SURGERY | Facility: CLINIC | Age: 56
End: 2021-06-10
Payer: SELF-PAY

## 2021-06-10 DIAGNOSIS — L91.0 HYPERTROPHIC SCAR: ICD-10-CM

## 2021-06-10 PROCEDURE — 11900 INJECT SKIN LESIONS </W 7: CPT

## 2021-06-12 PROBLEM — L91.0 HYPERTROPHIC SCAR: Status: ACTIVE | Noted: 2021-04-06

## 2021-10-18 NOTE — HISTORY OF PRESENT ILLNESS
[FreeTextEntry1] : pt w/ dorsal scar \par s/p Kenalog injection\par improving in thickness\par no pain and no complaints

## 2021-10-18 NOTE — REASON FOR VISIT
[FreeTextEntry1] : pt w/ dorsal scar  s/p kenalog injection improving in thickness no pain and no complaints

## 2022-10-24 NOTE — ASU PREOPERATIVE ASSESSMENT, ADULT (IPARK ONLY) - TEACHING/LEARNING EDUCATIONAL LEVEL
ED Triage Provider Note  [facility] EMERGENCY DEPARTMENT  Encounter Date: Oct 24, 2022    History:  Chief Complaint   Patient presents with     Shortness of Breath     Dav Lang is a 14 year old male who presents to the ED with cough and wheezing with known asthma, recent COVID19 contact.    Review of Systems:  No fever    Exam:  /65   Pulse 77   Temp 98.2  F (36.8  C) (Oral)   Resp 16   Wt 65.8 kg (145 lb)   SpO2 95%   General: No acute distress. Appears stated age.   Cardio: normal rate, extremities well perfused  Resp: Normal work of breathing, grossly normal respiratory rate  Neuro: Alert. Facial movement grossly symmetric. Grossly intact strength.       Medical Decision Making:  Patient arriving to the ED with problem as above. A medical screening exam was performed. Initial differential diagnosis includes but not limited to COVID19.    1:28 PM  orders initiated from Triage. The patient is most appropriate to return to the waiting room.       Kelley Reeves MD  10/24/2022 at 1:28 PM     Kelley Reeves MD  10/24/22 1328     high school

## 2023-07-31 NOTE — CONSULT LETTER
Problem: RESPIRATORY - ADULT  Goal: Achieves optimal ventilation and oxygenation  Description: INTERVENTIONS:  - Assess for changes in respiratory status  - Assess for changes in mentation and behavior  - Position to facilitate oxygenation and minimize respiratory effort  - Oxygen administered by appropriate delivery if ordered  - Initiate smoking cessation education as indicated  - Encourage broncho-pulmonary hygiene including cough, deep breathe, Incentive Spirometry  - Assess the need for suctioning and aspirate as needed  - Assess and instruct to report SOB or any respiratory difficulty  - Respiratory Therapy support as indicated  Outcome: Progressing     Problem: MOBILITY - ADULT  Goal: Maintain or return to baseline ADL function  Description: INTERVENTIONS:  -  Assess patient's ability to carry out ADLs; assess patient's baseline for ADL function and identify physical deficits which impact ability to perform ADLs (bathing, care of mouth/teeth, toileting, grooming, dressing, etc.)  - Assess/evaluate cause of self-care deficits   - Assess range of motion  - Assess patient's mobility; develop plan if impaired  - Assess patient's need for assistive devices and provide as appropriate  - Encourage maximum independence but intervene and supervise when necessary  - Involve family in performance of ADLs  - Assess for home care needs following discharge   - Consider OT consult to assist with ADL evaluation and planning for discharge  - Provide patient education as appropriate  Outcome: Progressing     Problem: SAFETY ADULT  Goal: Patient will remain free of falls  Description: INTERVENTIONS:  - Educate patient/family on patient safety including physical limitations  - Instruct patient to call for assistance with activity   - Consult OT/PT to assist with strengthening/mobility   - Keep Call bell within reach  - Keep bed low and locked with side rails adjusted as appropriate  - Keep care items and personal belongings [FreeTextEntry2] : none DOCTOR,UNKNOWN  within reach  - Initiate and maintain comfort rounds  - Make Fall Risk Sign visible to staff  - Offer Toileting every 2 Hours, in advance of need  - Initiate/Maintain bed alarm  - Obtain necessary fall risk management equipment: walker  - Apply yellow socks and bracelet for high fall risk patients  - Consider moving patient to room near nurses station  Outcome: Progressing     Problem: Knowledge Deficit  Goal: Patient/family/caregiver demonstrates understanding of disease process, treatment plan, medications, and discharge instructions  Description: Complete learning assessment and assess knowledge base.   Interventions:  - Provide teaching at level of understanding  - Provide teaching via preferred learning methods  Outcome: Progressing

## 2024-01-18 ENCOUNTER — OUTPATIENT (OUTPATIENT)
Dept: OUTPATIENT SERVICES | Facility: HOSPITAL | Age: 59
LOS: 1 days | Discharge: STILL ADMITTED OR ADMINISTRATIVE | End: 2024-01-18
Payer: COMMERCIAL

## 2024-01-18 PROCEDURE — 90839 PSYTX CRISIS INITIAL 60 MIN: CPT

## 2024-01-18 PROCEDURE — 99215 OFFICE O/P EST HI 40 MIN: CPT

## 2024-01-19 DIAGNOSIS — F41.1 GENERALIZED ANXIETY DISORDER: ICD-10-CM

## 2024-04-08 ENCOUNTER — APPOINTMENT (OUTPATIENT)
Dept: CARDIOLOGY | Facility: CLINIC | Age: 59
End: 2024-04-08
Payer: COMMERCIAL

## 2024-04-08 VITALS
WEIGHT: 171 LBS | SYSTOLIC BLOOD PRESSURE: 102 MMHG | HEIGHT: 63 IN | OXYGEN SATURATION: 95 % | HEART RATE: 86 BPM | BODY MASS INDEX: 30.3 KG/M2 | DIASTOLIC BLOOD PRESSURE: 70 MMHG

## 2024-04-08 VITALS — SYSTOLIC BLOOD PRESSURE: 100 MMHG | DIASTOLIC BLOOD PRESSURE: 70 MMHG

## 2024-04-08 DIAGNOSIS — E78.5 HYPERLIPIDEMIA, UNSPECIFIED: ICD-10-CM

## 2024-04-08 DIAGNOSIS — R73.03 PREDIABETES.: ICD-10-CM

## 2024-04-08 DIAGNOSIS — R01.1 CARDIAC MURMUR, UNSPECIFIED: ICD-10-CM

## 2024-04-08 PROCEDURE — 99203 OFFICE O/P NEW LOW 30 MIN: CPT

## 2024-04-08 NOTE — HISTORY OF PRESENT ILLNESS
[FreeTextEntry1] : Dear Kristal, Thank you for referring her for cardiovascular evaluation.  She is a 58-year-old with history of hypercholesterolemia, borderline diabetes and recent cardiac murmur. She was seen in your office for routine evaluation noted to have a cardiac murmur. She has no known history of coronary artery disease, hypertension, diabetes mellitus, recent smoking or family history of premature coronary artery disease. Her paternal his family history does contain CAD.

## 2024-04-08 NOTE — PHYSICAL EXAM
[Well Developed] : well developed [Normal Conjunctiva] : normal conjunctiva [Normal Venous Pressure] : normal venous pressure [Normal S1, S2] : normal S1, S2 [Clear Lung Fields] : clear lung fields [Soft] : abdomen soft [Normal Gait] : normal gait [No Edema] : no edema [No Rash] : no rash [Moves all extremities] : moves all extremities [Alert and Oriented] : alert and oriented [de-identified] : 1/6 systolic ejection murmur at the right upper sternal border.

## 2024-04-08 NOTE — DISCUSSION/SUMMARY
[FreeTextEntry1] : She is a 58-year-old with history of hypercholesterolemia, borderline diabetes and new cardiac murmur. ECG done in your office shows normal sinus rhythm with no acute ST segment abnormalities. I reviewed the pathophysiology of cardiac murmurs with her and suggested that she undergo echocardiography to define the Underlying cause of her cardiac murmur. I reassured her that this not likely related to something dangerous. Continue current medication doses for weight loss goals and LDL goal of less than 100 mg/dL. Encouraged routine aerobic activity. Follow-up as needed.

## 2024-04-11 ENCOUNTER — APPOINTMENT (OUTPATIENT)
Dept: CARDIOLOGY | Facility: CLINIC | Age: 59
End: 2024-04-11
Payer: COMMERCIAL

## 2024-04-11 ENCOUNTER — TRANSCRIPTION ENCOUNTER (OUTPATIENT)
Age: 59
End: 2024-04-11

## 2024-04-11 PROCEDURE — 93306 TTE W/DOPPLER COMPLETE: CPT

## 2024-04-12 ENCOUNTER — TRANSCRIPTION ENCOUNTER (OUTPATIENT)
Age: 59
End: 2024-04-12

## 2024-04-24 NOTE — H&P PST ADULT - LAST STRESS TEST
UV Treatment Record      Patient Information  Phototherapy orders per Dr. Mary  Diagnosis: Pruritus due to systemic disorder   Treatment:UVB  Skin Type: IV  Treatment order 1: Start at 350 mJ, increase by 60 mJ as tolerated per treatment to maximum of 1400 mJ, 3x/week     Treatment Information Area 1   Treatment #: 9  Date: 24  Joules: N/A  Tarah-joules: 830  Mins: 3:57  Cum: N/A  Special Shields: genital shield and goggles    Reaction to Treatment Area 1  Red: 0 - None  Tender/Itchin - None    Onsite Physician Covering:  Dr. Meek       denies

## 2024-08-29 ENCOUNTER — APPOINTMENT (OUTPATIENT)
Dept: SURGERY | Facility: CLINIC | Age: 59
End: 2024-08-29
Payer: COMMERCIAL

## 2024-08-29 VITALS
WEIGHT: 148 LBS | SYSTOLIC BLOOD PRESSURE: 119 MMHG | DIASTOLIC BLOOD PRESSURE: 74 MMHG | HEART RATE: 63 BPM | HEIGHT: 63 IN | BODY MASS INDEX: 26.22 KG/M2

## 2024-08-29 DIAGNOSIS — E04.2 NONTOXIC MULTINODULAR GOITER: ICD-10-CM

## 2024-08-29 PROCEDURE — 99204 OFFICE O/P NEW MOD 45 MIN: CPT

## 2024-08-30 NOTE — CONSULT LETTER
[Dear  ___] : Dear  [unfilled], [Consult Letter:] : I had the pleasure of evaluating your patient, [unfilled]. [Please see my note below.] : Please see my note below. [Consult Closing:] : Thank you very much for allowing me to participate in the care of this patient.  If you have any questions, please do not hesitate to contact me. [Sincerely,] : Sincerely, [FreeTextEntry2] : Dr. Yousuf Sanders, Dr. Kristal Sims [FreeTextEntry3] : Mario Robertson MD, FACS System Director, Endocrine Surgery [facility] Associate  Professor of Surgery [employer] at [facility] [DrAshvin  ___] : Dr. ROBLEDO

## 2024-08-30 NOTE — ASSESSMENT
[FreeTextEntry1] : lengthy discussion regarding options for management including active surveillance  vs thyroid lobectomy with possible paratracheal node dissection, with or without frozen section vs total thyroidectomy with possible paratracheal node dissection.  risks, benefits and alternatives discussed at length.  I have discussed with the patient the anatomy of the area, the pathophysiology of the disease process and the rationale for surgery.  The attendant risks, possible complications and expected postoperative course have been discussed in detail.  I have given the patient the opportunity to ask questions, and all questions have been answered to the patient's satisfaction.  will await thyroseq results and then decide about treatment. considering total thyroidectomy.  to call next week for results.

## 2024-08-30 NOTE — CONSULT LETTER
[Dear  ___] : Dear  [unfilled], [Consult Letter:] : I had the pleasure of evaluating your patient, [unfilled]. [Please see my note below.] : Please see my note below. [Consult Closing:] : Thank you very much for allowing me to participate in the care of this patient.  If you have any questions, please do not hesitate to contact me. [Sincerely,] : Sincerely, [FreeTextEntry2] : Dr. Yousuf Sanders, Dr. Kristal Sims [FreeTextEntry3] : Mario Robertson MD, FACS System Director, Endocrine Surgery [facility] Associate  Professor of Surgery [employer] [facility] at [facility] [DrAshvin  ___] : Dr. ROBLEDO

## 2024-08-30 NOTE — HISTORY OF PRESENT ILLNESS
[de-identified] : Pt c/o thyroid nodule which has increased in size. denies dysphagia, hoarseness, SOB or RT exposure sonogram: Right 3.4 cm, 8 mm and 5 mm, left 1.0 cm thyroid nodules, no parathyroids, no adenopathy  TSH 0.99, calcium 8.9 FNA (CBL):  FLUS, thyroseq pending I have reviewed all old and new data and available images.

## 2024-08-30 NOTE — HISTORY OF PRESENT ILLNESS
[de-identified] : Pt c/o thyroid nodule which has increased in size. denies dysphagia, hoarseness, SOB or RT exposure sonogram: Right 3.4 cm, 8 mm and 5 mm, left 1.0 cm thyroid nodules, no parathyroids, no adenopathy  TSH 0.99, calcium 8.9 FNA (CBL):  FLUS, thyroseq pending I have reviewed all old and new data and available images.

## 2024-08-30 NOTE — PHYSICAL EXAM
[de-identified] : 3 cm right thyroid nodule, well circumscribed and mobile [Laryngoscopy Performed] : laryngoscopy was performed, see procedure section for findings [Midline] : located in midline position [Normal] : orientation to person, place, and time: normal [de-identified] : indirect  laryngoscopy shows normal vocal cord mobility bilaterally with no lesions noted

## 2024-08-30 NOTE — PHYSICAL EXAM
[de-identified] : 3 cm right thyroid nodule, well circumscribed and mobile [Laryngoscopy Performed] : laryngoscopy was performed, see procedure section for findings [Midline] : located in midline position [Normal] : orientation to person, place, and time: normal [de-identified] : indirect  laryngoscopy shows normal vocal cord mobility bilaterally with no lesions noted

## 2024-11-22 ENCOUNTER — OUTPATIENT (OUTPATIENT)
Dept: OUTPATIENT SERVICES | Facility: HOSPITAL | Age: 59
LOS: 1 days | End: 2024-11-22

## 2024-11-22 VITALS
TEMPERATURE: 98 F | OXYGEN SATURATION: 97 % | HEIGHT: 63 IN | SYSTOLIC BLOOD PRESSURE: 97 MMHG | RESPIRATION RATE: 16 BRPM | WEIGHT: 154.98 LBS | DIASTOLIC BLOOD PRESSURE: 67 MMHG | HEART RATE: 72 BPM

## 2024-11-22 DIAGNOSIS — Z98.890 OTHER SPECIFIED POSTPROCEDURAL STATES: Chronic | ICD-10-CM

## 2024-11-22 DIAGNOSIS — E04.2 NONTOXIC MULTINODULAR GOITER: ICD-10-CM

## 2024-11-22 DIAGNOSIS — J45.909 UNSPECIFIED ASTHMA, UNCOMPLICATED: ICD-10-CM

## 2024-11-22 PROBLEM — F50.9 EATING DISORDER, UNSPECIFIED: Chronic | Status: INACTIVE | Noted: 2019-03-05 | Resolved: 2024-11-22

## 2024-11-22 RX ORDER — 0.9 % SODIUM CHLORIDE 0.9 %
1000 INTRAVENOUS SOLUTION INTRAVENOUS
Refills: 0 | Status: DISCONTINUED | OUTPATIENT
Start: 2024-12-02 | End: 2024-12-02

## 2024-11-22 NOTE — H&P PST ADULT - NSICDXFAMILYHX_GEN_ALL_CORE_FT
FAMILY HISTORY:  Father  Still living? Unknown  Family history of hypertension, Age at diagnosis: Age Unknown    Grandparent  Still living? Unknown  FH: heart disease, Age at diagnosis: Age Unknown

## 2024-11-22 NOTE — H&P PST ADULT - NSANTHOSAYNRD_GEN_A_CORE
56 No. KEENAN screening performed.  STOP BANG Legend: 0-2 = LOW Risk; 3-4 = INTERMEDIATE Risk; 5-8 = HIGH Risk

## 2024-11-22 NOTE — H&P PST ADULT - ENMT COMMENTS
Initial Anesthesia Post-op Note    Patient: Celi Zhang  Procedure(s) Performed: CYSTOSCOPY, WITH RIGHT LITHOTRIPSY AND  REMOVAL AND REPLACEMENT OF RIGHT URETERAL STENT,  FLUOROSCOPY  Anesthesia type: General    Vitals Value Taken Time   Temp 36.1 02/11/22 1149   Pulse 85 02/11/22 1149   Resp 16 02/11/22 1149   SpO2 99 02/11/22 1149   /64 02/11/22 1149         Patient Location: PACU Phase 1  Post-op Vital Signs:stable  Level of Consciousness: awake and alert  Respiratory Status: spontaneous ventilation and nasal cannula  Cardiovascular stable  Hydration: euvolemic  Pain Management: adequately controlled  Handoff: Handoff to receiving nurse was performed and questions were answered  Vomiting: none  Nausea: None  Airway Patency:patent  Post-op Assessment: no complications and patient tolerated procedure well with no complications      No complications documented.   Denies dentures. Denies loose teeth. MALLAMPATI III Full ROM neck. . Pre op dx :Nontoxic multinodular goiter

## 2024-11-22 NOTE — H&P PST ADULT - NECK
right thyroid nodule, well circumscribed and mobile/supple/symmetric/no tracheal deviation/no thyromegaly/thyroid nodule

## 2024-11-22 NOTE — H&P PST ADULT - PROBLEM SELECTOR PLAN 1
Patient is scheduled for right thyroid lobectomy, possible total thyroidectomy, paratracheal node dissection o 12/2/2024. Pre-op instructions provided. Pt given verbal and written instructions with teach back on chlorhexidine shampoo and Pepcid. Pt verbalized understanding with return demonstration.    EKG and echo in the chart Patient is scheduled for right thyroid lobectomy, possible total thyroidectomy, paratracheal node dissection on 12/2/2024. Pre-op instructions provided. Pt given verbal and written instructions with teach back on chlorhexidine shampoo and Pepcid. Pt verbalized understanding with return demonstration.    EKG and echo in the chart

## 2024-11-22 NOTE — H&P PST ADULT - PROBLEM SELECTOR PLAN 2
Patient instructed to use Asmanex on the morning of surgery as prescribed. Pt verbalized understanding.

## 2024-11-22 NOTE — H&P PST ADULT - LAST ECHOCARDIOGRAM
4/11/2024-  Left ventricular systolic function is normal with an ejection fraction visually estimated at 55 to 60 %.  Cardiac murmur is caused by LVOT turbulence with no sign of valvular abnormalities., {copy in chart from allscripts}

## 2024-11-22 NOTE — H&P PST ADULT - RESPIRATORY AND THORAX COMMENTS
Hx asthma (no recent exacerbation or hospitalizations or intubations), Last exacerbation was "years ago", Use Albuterol as needed

## 2024-11-22 NOTE — H&P PST ADULT - HISTORY OF PRESENT ILLNESS
59 yr old female with PMH of asthma, HLD, anxiety  presents to PST for preop evaluation.  Pt c/o thyroid nodule which has increased in size. denies dysphagia, hoarseness, SOB or RT exposure. Patient is scheduled for right thyroid lobectomy, possible total thyroidectomy, paratracheal node dissection o 12/2/2024.  59 yr old female with PMH of asthma, HLD, anxiety  presents to PST for preop evaluation.  Pt c/o thyroid nodule which has increased in size. denies dysphagia, hoarseness, SOB or RT exposure. Patient is scheduled for right thyroid lobectomy, possible total thyroidectomy, paratracheal node dissection on 12/2/2024.

## 2024-11-22 NOTE — H&P PST ADULT - NSICDXPASTMEDICALHX_GEN_ALL_CORE_FT
PAST MEDICAL HISTORY:  Anxiety and depression    Asthma     Binge eating     Hyperlipidemia     Nontoxic multinodular goiter     Obesity     Shingles

## 2024-11-29 NOTE — ASU PATIENT PROFILE, ADULT - MEDICATIONS BROUGHT TO HOSPITAL, PROFILE
Health Maintenance Due   Topic Date Due     PREVENTIVE CARE VISIT  2010     HEPATITIS A IMMUNIZATION (2 of 2 - 2-dose series) 10/27/2016     Berkley PALACIOS LPN    
no

## 2024-11-29 NOTE — ASU PATIENT PROFILE, ADULT - FALL HARM RISK - UNIVERSAL INTERVENTIONS
Bed in lowest position, wheels locked, appropriate side rails in place/Call bell, personal items and telephone in reach/Instruct patient to call for assistance before getting out of bed or chair/Non-slip footwear when patient is out of bed/[place] to call system/Physically safe environment - no spills, clutter or unnecessary equipment/Purposeful Proactive Rounding/Room/bathroom lighting operational, light cord in reach

## 2024-12-02 ENCOUNTER — INPATIENT (INPATIENT)
Facility: HOSPITAL | Age: 59
LOS: 0 days | Discharge: HOME | End: 2024-12-03
Attending: SPECIALIST | Admitting: SPECIALIST
Payer: COMMERCIAL

## 2024-12-02 ENCOUNTER — TRANSCRIPTION ENCOUNTER (OUTPATIENT)
Age: 59
End: 2024-12-02

## 2024-12-02 ENCOUNTER — APPOINTMENT (OUTPATIENT)
Dept: SURGERY | Facility: HOSPITAL | Age: 59
End: 2024-12-02
Payer: COMMERCIAL

## 2024-12-02 ENCOUNTER — RESULT REVIEW (OUTPATIENT)
Age: 59
End: 2024-12-02

## 2024-12-02 VITALS
OXYGEN SATURATION: 97 % | HEART RATE: 73 BPM | TEMPERATURE: 98 F | SYSTOLIC BLOOD PRESSURE: 105 MMHG | WEIGHT: 154.98 LBS | DIASTOLIC BLOOD PRESSURE: 90 MMHG | HEIGHT: 63 IN | RESPIRATION RATE: 15 BRPM

## 2024-12-02 DIAGNOSIS — E04.2 NONTOXIC MULTINODULAR GOITER: ICD-10-CM

## 2024-12-02 DIAGNOSIS — Z98.890 OTHER SPECIFIED POSTPROCEDURAL STATES: Chronic | ICD-10-CM

## 2024-12-02 PROCEDURE — 60220 PARTIAL REMOVAL OF THYROID: CPT

## 2024-12-02 PROCEDURE — 88307 TISSUE EXAM BY PATHOLOGIST: CPT | Mod: 26

## 2024-12-02 PROCEDURE — 13132 CMPLX RPR F/C/C/M/N/AX/G/H/F: CPT | Mod: 59

## 2024-12-02 DEVICE — CLIP LIG TI SM/WIDE 24/BX: Type: IMPLANTABLE DEVICE | Status: FUNCTIONAL

## 2024-12-02 RX ORDER — BENZOCAINE, MENTHOL 15; 3.6 MG/1; MG/1
1 LOZENGE ORAL
Refills: 0 | Status: DISCONTINUED | OUTPATIENT
Start: 2024-12-02 | End: 2024-12-03

## 2024-12-02 RX ORDER — ACETAMINOPHEN 500MG 500 MG/1
2 TABLET, COATED ORAL
Qty: 0 | Refills: 0 | DISCHARGE
Start: 2024-12-02

## 2024-12-02 RX ORDER — MOMETASONE FUROATE 220 UG/1
2 INHALANT RESPIRATORY (INHALATION)
Refills: 0 | DISCHARGE

## 2024-12-02 RX ORDER — ONDANSETRON HYDROCHLORIDE 4 MG/1
4 TABLET, FILM COATED ORAL ONCE
Refills: 0 | Status: DISCONTINUED | OUTPATIENT
Start: 2024-12-02 | End: 2024-12-02

## 2024-12-02 RX ORDER — HYDROMORPHONE HYDROCHLORIDE 2 MG/1
0.5 TABLET ORAL
Refills: 0 | Status: DISCONTINUED | OUTPATIENT
Start: 2024-12-02 | End: 2024-12-02

## 2024-12-02 RX ORDER — ACETAMINOPHEN 500MG 500 MG/1
650 TABLET, COATED ORAL EVERY 6 HOURS
Refills: 0 | Status: DISCONTINUED | OUTPATIENT
Start: 2024-12-02 | End: 2024-12-03

## 2024-12-02 RX ORDER — ACETAMINOPHEN 500MG 500 MG/1
1000 TABLET, COATED ORAL EVERY 6 HOURS
Refills: 0 | Status: DISCONTINUED | OUTPATIENT
Start: 2024-12-02 | End: 2024-12-02

## 2024-12-02 RX ORDER — OXYCODONE HYDROCHLORIDE 30 MG/1
5 TABLET ORAL EVERY 4 HOURS
Refills: 0 | Status: DISCONTINUED | OUTPATIENT
Start: 2024-12-02 | End: 2024-12-03

## 2024-12-02 RX ORDER — BENZOCAINE, MENTHOL 15; 3.6 MG/1; MG/1
1 LOZENGE ORAL
Refills: 0 | Status: DISCONTINUED | OUTPATIENT
Start: 2024-12-02 | End: 2024-12-02

## 2024-12-02 RX ORDER — OXYCODONE HYDROCHLORIDE 30 MG/1
5 TABLET ORAL ONCE
Refills: 0 | Status: DISCONTINUED | OUTPATIENT
Start: 2024-12-02 | End: 2024-12-02

## 2024-12-02 RX ORDER — MOMETASONE FUROATE 220 UG/1
1 INHALANT RESPIRATORY (INHALATION) DAILY
Refills: 0 | Status: DISCONTINUED | OUTPATIENT
Start: 2024-12-02 | End: 2024-12-02

## 2024-12-02 RX ORDER — MOMETASONE FUROATE 220 UG/1
2 INHALANT RESPIRATORY (INHALATION) DAILY
Refills: 0 | Status: DISCONTINUED | OUTPATIENT
Start: 2024-12-02 | End: 2024-12-03

## 2024-12-02 RX ORDER — BIOTIN 10 MG
1 TABLET ORAL
Refills: 0 | DISCHARGE

## 2024-12-02 RX ORDER — OXYCODONE HYDROCHLORIDE 30 MG/1
5 TABLET ORAL EVERY 6 HOURS
Refills: 0 | Status: DISCONTINUED | OUTPATIENT
Start: 2024-12-02 | End: 2024-12-02

## 2024-12-02 RX ADMIN — BENZOCAINE, MENTHOL 1 LOZENGE: 15; 3.6 LOZENGE ORAL at 13:36

## 2024-12-02 RX ADMIN — Medication 40 MILLIGRAM(S): at 21:32

## 2024-12-02 RX ADMIN — Medication 40 MILLIGRAM(S): at 19:04

## 2024-12-02 RX ADMIN — OXYCODONE HYDROCHLORIDE 5 MILLIGRAM(S): 30 TABLET ORAL at 13:35

## 2024-12-02 RX ADMIN — MOMETASONE FUROATE 2 PUFF(S): 220 INHALANT RESPIRATORY (INHALATION) at 21:32

## 2024-12-02 NOTE — PATIENT PROFILE ADULT - FALL HARM RISK - HARM RISK INTERVENTIONS

## 2024-12-02 NOTE — PACU DISCHARGE NOTE - NS MD DISCHARGE NOTE DISCHARGE
Exercise Education Note - Follow Up    Patient: Rain Barreto       Date: 08/15/2022    Patient was seen in person for exercise counseling follow up session. Approximately 30 minutes was spent with the patient reviewing previously set goals, questions, and next steps.     Goal(s) Achievement Status: patient has been 100% successful at meeting previously set goals. She is going to the gym 4-5 days per week alternating strength and cardio days exercising for approximately 60 min each session. She is losing weight, feeling stronger, and seeing changes in her body composition.  Her water intake is up to 40 oz/ day.She had her dm education appointment and was very pleased with all she learned.    Next Steps: Going forward patient is going to practice nutrtional recommendations from DM Education to help increase energy and improve BG mgt. She will add some arm and core exercises to her strength program. Add an incline when walking on the treadmill, and continue to increase her water intake.    Additional Follow up scheduled for 4 weeks.       Fran Moore  8/15/2022  12:08 EDT    
Floor

## 2024-12-02 NOTE — BRIEF OPERATIVE NOTE - OPERATION/FINDINGS
Right lobe of the thyroid resected. 1.5cm goiter was seen on the right lobe of the thyroid. Parathyroids identified and preserved. Recurrent laryngeal nerve identified and preserved. AMINA drain placed in the neck after hemostatis was achieved. Then the neck was closed.

## 2024-12-02 NOTE — DISCHARGE NOTE PROVIDER - CARE PROVIDER_API CALL
Mario Robertson  Surgery  [address] [place] [address] [address]  [place] [address]  Phone: [phone]  Fax: [phone]  Follow Up Time:

## 2024-12-02 NOTE — ASU PREOP CHECKLIST - ORDERS/MEDICATION ADMINISTRATION RECORD ON CHART
Pt is sleeping in prone position. Breathing regular and non labored. Call bell in reach. Bed in low position/bed locked. done

## 2024-12-02 NOTE — DISCHARGE NOTE PROVIDER - NSDCMRMEDTOKEN_GEN_ALL_CORE_FT
acetaminophen 500 mg oral tablet: 2 tab(s) orally every 6 hours As needed Temp greater or equal to 38.5C (101.3F), Moderate Pain (4 - 6)  Asmanex Twisthaler 30 Dose 110 mcg/inh inhalation aerosol powder: 2 spray(s) inhaled once a day am  atorvastatin 40 mg oral tablet: 1 tab(s) orally once a day  biotin 5000 mcg oral capsule: 1 cap(s) orally once a day Last dose 11/22/24  FLUoxetine 40 mg oral capsule: 1 cap(s) orally once a day  Nasal Spray, each Nares Daily:   Vitamin B 12, 1000, mg, PO, Daily:   Vitamin D 3, 2000mg, Daily AM:

## 2024-12-02 NOTE — PATIENT PROFILE ADULT - NSPROPOAPRESSUREINJURY_GEN_A_NUR
After Dr. Lyn [employer]. Script sent to [facility] pharmacy. Patient knows to go and get labs in 1-2 weeks. (BMP) under Dr. Lyn.   
no

## 2024-12-02 NOTE — CHART NOTE - NSCHARTNOTEFT_GEN_A_CORE
SURGERY POST OP CHECK    STATUS POST PROCEDURE: right lobe thyroidectomy     SUBJECTIVE: Pt seen and examined at bedside. Patient reports appropriate surgical incisional pain; pain is controlled. Denies numbness, tingling, muscle cramps, tetany. No signs of hematoma present.  Pt is tolerating diet. Pt is ambulating well    --------------------------------------------------------------------------------------------------------------------------------------------------------------------------------------------------------------  OBJECTIVE:  Vital Signs Last 24 Hrs  T(C): 36.7 (02 Dec 2024 17:34), Max: 37.3 (02 Dec 2024 12:35)  T(F): 98 (02 Dec 2024 17:34), Max: 99.1 (02 Dec 2024 12:35)  HR: 73 (02 Dec 2024 17:34) (71 - 80)  BP: 121/72 (02 Dec 2024 17:34) (105/90 - 139/79)  BP(mean): 82 (02 Dec 2024 13:30) (82 - 89)  RR: 16 (02 Dec 2024 17:34) (15 - 19)  SpO2: 94% (02 Dec 2024 17:34) (94% - 99%)    Parameters below as of 02 Dec 2024 17:34  Patient On (Oxygen Delivery Method): room air      I&O's Summary    02 Dec 2024 07:01  -  02 Dec 2024 19:51  --------------------------------------------------------  IN: 510 mL / OUT: 393 mL / NET: 117 mL        PHYSICAL EXAM:  Constitutional: Well developed, well nourished, NAD  ENT: thyroid dressings c/d/i. One AMINA drain visualized with SS,    Chest: Symmetric chest rise bilaterally,   Abdomen: Soft, nontender, nondistended  Groin: Soft, nontender  Extremities: Warm to touch, soft, normal strength, sensory and motor intact. No cyanosis/erythema/edema/hematoma  ----------------------------------------------------------------------------------------------------------------------------------------------------------------------------------------------------------------  ASSESSMENT:  59 year old female who is now POD#0 from right lobe thyroidectomy and doing well on post op check. Patient complaints of no numbness, no tingling, no tetany, or any other symptoms.   .    PLAN:  - AMINA drain to be removed tmr AM   - Head of bed elevated at 30-45 degrees   - Diet: Regular   - Analgesia and antiemetics as needed  - Strict I&O's  - Incentive spirometry  - OOB as tolerated  - DVT prophylaxis: SCD  - Monitor overnight  - No AM labs   - Dispo: Discharge home in AM if everything looks good SURGERY POST OP CHECK    STATUS POST PROCEDURE: right lobe thyroidectomy     SUBJECTIVE: Pt seen and examined at bedside. Patient reports appropriate surgical incisional pain; pain is controlled. Denies numbness, tingling, muscle cramps, tetany. No signs of hematoma present. Denies hoarseness, weak voice, and difficulty swallowing.  Pt is tolerating diet. Pt is ambulating well    --------------------------------------------------------------------------------------------------------------------------------------------------------------------------------------------------------------  OBJECTIVE:  Vital Signs Last 24 Hrs  T(C): 36.7 (02 Dec 2024 17:34), Max: 37.3 (02 Dec 2024 12:35)  T(F): 98 (02 Dec 2024 17:34), Max: 99.1 (02 Dec 2024 12:35)  HR: 73 (02 Dec 2024 17:34) (71 - 80)  BP: 121/72 (02 Dec 2024 17:34) (105/90 - 139/79)  BP(mean): 82 (02 Dec 2024 13:30) (82 - 89)  RR: 16 (02 Dec 2024 17:34) (15 - 19)  SpO2: 94% (02 Dec 2024 17:34) (94% - 99%)    Parameters below as of 02 Dec 2024 17:34  Patient On (Oxygen Delivery Method): room air      I&O's Summary    02 Dec 2024 07:01  -  02 Dec 2024 19:51  --------------------------------------------------------  IN: 510 mL / OUT: 393 mL / NET: 117 mL        PHYSICAL EXAM:  Constitutional: Well developed, well nourished, NAD  ENT: thyroid dressings c/d/i. One AMINA drain visualized with SS,    Chest: Symmetric chest rise bilaterally,   Abdomen: Soft, nontender, nondistended  Groin: Soft, nontender  Extremities: Warm to touch, soft, normal strength, sensory and motor intact. No cyanosis/erythema/edema/hematoma  ----------------------------------------------------------------------------------------------------------------------------------------------------------------------------------------------------------------  ASSESSMENT:  59 year old female who is now POD#0 from right lobe thyroidectomy and doing well on post op check. Patient complaints of no numbness, no tingling, no tetany, or any other symptoms.   .    PLAN:  - AMINA drain to be removed tmr AM   - Head of bed elevated at 30-45 degrees   - Diet: Regular   - Analgesia and antiemetics as needed  - Strict I&O's  - Incentive spirometry  - OOB as tolerated  - DVT prophylaxis: SCD  - Monitor overnight  - No AM labs   - Dispo: Discharge home in AM if everything looks good

## 2024-12-03 ENCOUNTER — TRANSCRIPTION ENCOUNTER (OUTPATIENT)
Age: 59
End: 2024-12-03

## 2024-12-03 ENCOUNTER — NON-APPOINTMENT (OUTPATIENT)
Age: 59
End: 2024-12-03

## 2024-12-03 VITALS
TEMPERATURE: 98 F | RESPIRATION RATE: 18 BRPM | SYSTOLIC BLOOD PRESSURE: 105 MMHG | DIASTOLIC BLOOD PRESSURE: 63 MMHG | OXYGEN SATURATION: 95 % | HEART RATE: 81 BPM

## 2024-12-03 NOTE — DISCHARGE NOTE NURSING/CASE MANAGEMENT/SOCIAL WORK - PATIENT PORTAL LINK FT
You can access the FollowMyHealth Patient Portal offered by [facility] by registering at the following website: [email or web address]. By joining [facility]’s FollowMyHealth portal, you will also be able to view your health information using other applications (apps) compatible with our system.

## 2024-12-03 NOTE — PROGRESS NOTE ADULT - SUBJECTIVE AND OBJECTIVE BOX
1 day s/p thyroid lobectomy. afebrile. no collections. now taking adequate po. AMINA removed. discharge home.  f/u in office
D TEAM Surgery Progress Note  Patient is a 59y old  Female who presents with a chief complaint of thyroid surgery (02 Dec 2024 12:33)    SUBJECTIVE: Patient seen and examined at bedside with surgical team, patient complains of a mild sore throat, otherwise pain controlled. Tolerating diet, no difficulty swallowing, no difficulty breathing.     OBJECTIVE:    Vital Signs Last 24 Hrs  T(C): 36.6 (03 Dec 2024 05:15), Max: 37.3 (02 Dec 2024 12:35)  T(F): 97.9 (03 Dec 2024 05:15), Max: 99.1 (02 Dec 2024 12:35)  HR: 70 (03 Dec 2024 05:15) (70 - 80)  BP: 109/71 (03 Dec 2024 05:15) (105/90 - 139/79)  BP(mean): 82 (02 Dec 2024 13:30) (82 - 89)  RR: 18 (03 Dec 2024 05:15) (15 - 19)  SpO2: 97% (03 Dec 2024 05:15) (94% - 99%)    Parameters below as of 03 Dec 2024 05:15  Patient On (Oxygen Delivery Method): room air    I&O's Detail    02 Dec 2024 07:01  -  03 Dec 2024 07:00  --------------------------------------------------------  IN:    Lactated Ringers: 30 mL    Oral Fluid: 960 mL  Total IN: 990 mL    OUT:    Bulb (mL): 28 mL    Voided (mL): 975 mL  Total OUT: 1003 mL    Total NET: -13 mL      MEDICATIONS  (STANDING):  atorvastatin 40 milliGRAM(s) Oral at bedtime  FLUoxetine 40 milliGRAM(s) Oral daily  mometasone 110 MICROgram(s) Inhaler 2 Puff(s) Inhalation daily    MEDICATIONS  (PRN):  acetaminophen     Tablet .. 650 milliGRAM(s) Oral every 6 hours PRN Mild Pain (1 - 3)  benzocaine/menthol Lozenge 1 Lozenge Oral every 2 hours PRN Sore Throat  oxyCODONE    IR 5 milliGRAM(s) Oral every 4 hours PRN Severe Pain (7 - 10)      PHYSICAL EXAM:  Constitutional: A&Ox3, NAD  HEENT: dressing C/D/I, soft, mildly TTP, AMINA SS  Respiratory: Unlabored breathing on RA  Abdomen: Soft, nondistended, NTTP. No rebound or guarding.  Extremities: WWP, SCHULER spontaneously    LABS:

## 2024-12-03 NOTE — PROGRESS NOTE ADULT - ASSESSMENT
59 year old female who is now POD1 from right lobe thyroidectomy. Recovering appropriately on the surgical floors.    PLAN:  - f/u plan for AMINA removal  - Head of bed elevated at 30-45 degrees   - Diet: Regular   - Analgesia and antiemetics as needed  - Strict I&O's  - Incentive spirometry  - OOB as tolerated  - DVT prophylaxis: SCD  - Dispo: tentatively dc home today       D team surgery  [id number]

## 2024-12-03 NOTE — DISCHARGE NOTE NURSING/CASE MANAGEMENT/SOCIAL WORK - FINANCIAL ASSISTANCE
[facility] provides services at a reduced cost to those who are determined to be eligible through [facility]’s financial assistance program. Information regarding [facility]’s financial assistance program can be found by going to [email or web address] or by calling [phone].

## 2024-12-10 LAB — SURGICAL PATHOLOGY STUDY: SIGNIFICANT CHANGE UP

## 2024-12-12 ENCOUNTER — APPOINTMENT (OUTPATIENT)
Dept: SURGERY | Facility: CLINIC | Age: 59
End: 2024-12-12
Payer: COMMERCIAL

## 2024-12-12 DIAGNOSIS — C73 MALIGNANT NEOPLASM OF THYROID GLAND: ICD-10-CM

## 2024-12-12 PROBLEM — R63.2 POLYPHAGIA: Chronic | Status: ACTIVE | Noted: 2024-11-22

## 2024-12-12 PROBLEM — B02.9 ZOSTER WITHOUT COMPLICATIONS: Chronic | Status: ACTIVE | Noted: 2024-11-22

## 2024-12-12 PROBLEM — E04.2 NONTOXIC MULTINODULAR GOITER: Chronic | Status: ACTIVE | Noted: 2024-11-22

## 2024-12-12 PROCEDURE — 99024 POSTOP FOLLOW-UP VISIT: CPT

## 2024-12-20 NOTE — ASU PATIENT PROFILE, ADULT - MEDICATION HERBAL REMEDIES, PROFILE
Pt having increasing AF, persistently out since yesterday 3 pm.  Add Eliquis 5 mg BID.  Increase Bystolic to 10 mg BID.  Check ILR on Monday. If remains persistently out of rhythm >> RONNY/DCCV>    Also, obtain echo.   no

## 2025-01-23 ENCOUNTER — APPOINTMENT (OUTPATIENT)
Dept: SURGERY | Facility: CLINIC | Age: 60
End: 2025-01-23
Payer: COMMERCIAL

## 2025-01-23 DIAGNOSIS — C73 MALIGNANT NEOPLASM OF THYROID GLAND: ICD-10-CM

## 2025-01-23 PROCEDURE — 99024 POSTOP FOLLOW-UP VISIT: CPT

## 2025-05-15 ENCOUNTER — APPOINTMENT (OUTPATIENT)
Dept: SURGERY | Facility: CLINIC | Age: 60
End: 2025-05-15
Payer: COMMERCIAL

## 2025-05-15 VITALS — HEIGHT: 63 IN | BODY MASS INDEX: 30.12 KG/M2 | WEIGHT: 170 LBS

## 2025-05-15 DIAGNOSIS — C73 MALIGNANT NEOPLASM OF THYROID GLAND: ICD-10-CM

## 2025-05-15 PROCEDURE — 99213 OFFICE O/P EST LOW 20 MIN: CPT

## (undated) DEVICE — ELCTR GROUNDING PAD ADULT COVIDIEN

## (undated) DEVICE — SUT CHROMIC 3-0 27" SH

## (undated) DEVICE — SUT VICRYL 2-0 27" SH UNDYED

## (undated) DEVICE — SUT PROLENE 0 30" CT-1

## (undated) DEVICE — PACK HEAD & NECK

## (undated) DEVICE — CANISTER DISPOSABLE THIN WALL 3000CC

## (undated) DEVICE — SUT VICRYL PLUS 2-0 18" TIES UNDYED

## (undated) DEVICE — BIPOLAR FORCEP CORD WECK STANDARD 12FT

## (undated) DEVICE — DRAIN RESERVOIR FOR JACKSON PRATT 100CC CARDINAL

## (undated) DEVICE — LAP PAD W RING 18 X 18"

## (undated) DEVICE — VENODYNE/SCD SLEEVE CALF MEDIUM

## (undated) DEVICE — PROTECTOR HEEL / ELBOW FLUFFY

## (undated) DEVICE — DRAIN JACKSON PRATT 7MM FLAT FULL NO TROCAR

## (undated) DEVICE — PREP BETADINE KIT

## (undated) DEVICE — SAFETY PIN

## (undated) DEVICE — DRSG BENZOIN 0.6CC

## (undated) DEVICE — SUT VICRYL 3-0 18" TIES UNDYED

## (undated) DEVICE — ELCTR BOVIE PENCIL SMOKE EVACUATION

## (undated) DEVICE — SOL IRR POUR H2O 500ML

## (undated) DEVICE — DRAPE 3/4 SHEET 52X76"

## (undated) DEVICE — SUT MONOCRYL 4-0 27" PS-2 UNDYED

## (undated) DEVICE — SUT SILK 2-0 18" FS

## (undated) DEVICE — LABELS BLANK W PEN

## (undated) DEVICE — SUT VICRYL 3-0 18" SH (POP-OFF)

## (undated) DEVICE — SOL IRR POUR H2O 1500ML

## (undated) DEVICE — GLV 7 PROTEXIS (WHITE)

## (undated) DEVICE — DRAPE LAPAROTOMY TRANSVERSE